# Patient Record
Sex: MALE | Race: WHITE
[De-identification: names, ages, dates, MRNs, and addresses within clinical notes are randomized per-mention and may not be internally consistent; named-entity substitution may affect disease eponyms.]

---

## 2023-06-14 ENCOUNTER — APPOINTMENT (OUTPATIENT)
Dept: ORTHOPEDIC SURGERY | Facility: CLINIC | Age: 53
End: 2023-06-14
Payer: COMMERCIAL

## 2023-06-14 DIAGNOSIS — Z00.00 ENCOUNTER FOR GENERAL ADULT MEDICAL EXAMINATION W/OUT ABNORMAL FINDINGS: ICD-10-CM

## 2023-06-14 PROCEDURE — 99203 OFFICE O/P NEW LOW 30 MIN: CPT

## 2023-06-14 PROCEDURE — 72040 X-RAY EXAM NECK SPINE 2-3 VW: CPT

## 2023-06-14 PROCEDURE — 73010 X-RAY EXAM OF SHOULDER BLADE: CPT | Mod: RT

## 2023-06-14 PROCEDURE — 73030 X-RAY EXAM OF SHOULDER: CPT | Mod: RT

## 2023-06-14 RX ORDER — CYCLOBENZAPRINE HYDROCHLORIDE 5 MG/1
5 TABLET, FILM COATED ORAL
Qty: 30 | Refills: 0 | Status: ACTIVE | COMMUNITY
Start: 2023-06-14 | End: 1900-01-01

## 2023-06-14 RX ORDER — METHYL SALICYLATE/MENTH/CAMPH 30%-10%-4%
CREAM (GRAM) TOPICAL AS DIRECTED
Qty: 1 | Refills: 0 | Status: ACTIVE | COMMUNITY
Start: 2023-06-14 | End: 1900-01-01

## 2023-06-14 RX ORDER — METHYLPREDNISOLONE 4 MG/1
4 TABLET ORAL
Qty: 1 | Refills: 0 | Status: ACTIVE | COMMUNITY
Start: 2023-06-14 | End: 1900-01-01

## 2023-06-15 NOTE — IMAGING
[de-identified] : Cervical spine\par No swelling, no ecchymosis\par Minimally TTP along scalenes\par No cervical or paracervical tenderness\par Full range of motion with no pain\par 5/5 deltoid, biceps, triceps and wrist flexors/extensors\par Negative spurling, negative hernández\par Reflexes +2, intact motor and sensory distally\par \par Shoulder Right\par TTP at pectoralis muscle\par  / 170 with pain\par Abd 80 / 80 with pain\par ER at side 60 / 60\par IR L1 / T10\par SILT throughout\par - Isabela's negative\par - Belly Press negative \par - Hawkin's positive\par - Wilkinson's negative\par Mild pain reproduced with resisted shoulder abduction at 90 degrees forward flexion\par

## 2023-06-15 NOTE — HISTORY OF PRESENT ILLNESS
[de-identified] : 06/14/2023 FELISA 53 year M is here for right Shoulder \par Location: Right anterior clavicle\par Injury: no known injury\par Duration: 3 weeks \par Prior treatments: Seen at urgent care, no treatments\par Hand dominance: Right\par Occupation: \par \par Patient is a 52yo RHD male presenting for evaluation of R anterior chest wall and neck pain x 3 weeks. Patient was seen at an  and was diagnosed with a muscle spasm, treated with naproxen and lidocaine patches.  Patient states he has only used bengay cream and "1 or 2 naproxen" with mild relief. Pain with shoulder motion described as sharp and shooting that radiates from anterior neck to the anterior shoulder. States no recent injury or trauma. Pain worse with reaching, improved with rest.  Denies n/t.

## 2023-06-15 NOTE — DISCUSSION/SUMMARY
[de-identified] : The patient was advised of the diagnosis. The natural history of the pathology was explained in full to the patient in layman's terms. All questions were answered. The risks and benefits of surgical and non-surgical treatment alternatives were explained in full to the patient.\par -MDP - discussed r/b/a.\par -flexeril - discussed r/b/a. \par -RTC 2 weeks for clinical check

## 2023-06-15 NOTE — DATA REVIEWED
[FreeTextEntry1] : XR Cervical spine 3 views: no acute fractures\par XR right shoulder 2 views: no acute fractures\par XR right scapula 2 views: no acute fractures

## 2023-06-28 ENCOUNTER — APPOINTMENT (OUTPATIENT)
Dept: ORTHOPEDIC SURGERY | Facility: CLINIC | Age: 53
End: 2023-06-28
Payer: COMMERCIAL

## 2023-06-28 DIAGNOSIS — M75.81 OTHER SHOULDER LESIONS, RIGHT SHOULDER: ICD-10-CM

## 2023-06-28 PROCEDURE — 99213 OFFICE O/P EST LOW 20 MIN: CPT

## 2023-08-09 ENCOUNTER — APPOINTMENT (OUTPATIENT)
Dept: ORTHOPEDIC SURGERY | Facility: CLINIC | Age: 53
End: 2023-08-09

## 2023-08-10 NOTE — IMAGING
[de-identified] : Cervical spine No swelling, no ecchymosis Minimally TTP along scalenes No cervical or paracervical tenderness Full range of motion with no pain 5/5 deltoid, biceps, triceps and wrist flexors/extensors Negative spurling, negative hernández Reflexes +2, intact motor and sensory distally  Shoulder Right TTP at pectoralis muscle  / 170 with pain Abd 80 / 80 with pain ER at side 60 / 60 IR L1 / T10 SILT throughout - Isabela's negative - Belly Press negative  - Hawkin's positive - Wilkinson's negative Mild pain reproduced with resisted shoulder abduction at 90 degrees forward flexion

## 2023-08-10 NOTE — HISTORY OF PRESENT ILLNESS
[de-identified] : Patient returns today for repeat evaluation.  He completed his Medrol Dosepak as instructed and notes moderate improvement in his previous right chest wall pain.  06/14/2023 FELISA 53 year M is here for right Shoulder  Location: Right anterior clavicle Injury: no known injury Duration: 3 weeks  Prior treatments: Seen at urgent care, no treatments Hand dominance: Right Occupation: [employer]  Patient is a 54yo RHD male presenting for evaluation of R anterior chest wall and neck pain x 3 weeks. Patient was seen at an  and was diagnosed with a muscle spasm, treated with naproxen and lidocaine patches.  Patient states he has only used bengay cream and "1 or 2 naproxen" with mild relief. Pain with shoulder motion described as sharp and shooting that radiates from anterior neck to the anterior shoulder. States no recent injury or trauma. Pain worse with reaching, improved with rest.  Denies n/t.